# Patient Record
Sex: MALE | Race: WHITE
[De-identification: names, ages, dates, MRNs, and addresses within clinical notes are randomized per-mention and may not be internally consistent; named-entity substitution may affect disease eponyms.]

---

## 2023-06-30 ENCOUNTER — HOSPITAL ENCOUNTER (EMERGENCY)
Dept: HOSPITAL 75 - ER | Age: 58
LOS: 1 days | Discharge: HOME | End: 2023-07-01
Payer: COMMERCIAL

## 2023-06-30 VITALS — WEIGHT: 144.84 LBS | BODY MASS INDEX: 25.03 KG/M2 | HEIGHT: 63.78 IN

## 2023-06-30 DIAGNOSIS — Z28.310: ICD-10-CM

## 2023-06-30 DIAGNOSIS — R00.0: ICD-10-CM

## 2023-06-30 DIAGNOSIS — Z79.4: ICD-10-CM

## 2023-06-30 DIAGNOSIS — E11.65: Primary | ICD-10-CM

## 2023-06-30 LAB
ALBUMIN SERPL-MCNC: 3.4 GM/DL (ref 3.2–4.5)
ALP SERPL-CCNC: 133 U/L (ref 40–136)
ALT SERPL-CCNC: 14 U/L (ref 0–55)
BASOPHILS # BLD AUTO: 0 10^3/UL (ref 0–0.1)
BASOPHILS NFR BLD AUTO: 0 % (ref 0–10)
BILIRUB SERPL-MCNC: 0.2 MG/DL (ref 0.1–1)
BUN/CREAT SERPL: 12
CALCIUM SERPL-MCNC: 8.5 MG/DL (ref 8.5–10.1)
CHLORIDE SERPL-SCNC: 100 MMOL/L (ref 98–107)
CO2 SERPL-SCNC: 18 MMOL/L (ref 21–32)
CREAT SERPL-MCNC: 1.13 MG/DL (ref 0.6–1.3)
EOSINOPHIL # BLD AUTO: 0.2 10^3/UL (ref 0–0.3)
EOSINOPHIL NFR BLD AUTO: 3 % (ref 0–10)
GFR SERPLBLD BASED ON 1.73 SQ M-ARVRAT: 75 ML/MIN
GLUCOSE SERPL-MCNC: 678 MG/DL (ref 70–105)
HCT VFR BLD CALC: 39 % (ref 40–54)
HGB BLD-MCNC: 13.9 G/DL (ref 13.3–17.7)
LIPASE SERPL-CCNC: 63 U/L (ref 8–78)
LYMPHOCYTES # BLD AUTO: 2.5 10^3/UL (ref 1–4)
LYMPHOCYTES NFR BLD AUTO: 34 % (ref 12–44)
MANUAL DIFFERENTIAL PERFORMED BLD QL: NO
MCH RBC QN AUTO: 29 PG (ref 25–34)
MCHC RBC AUTO-ENTMCNC: 35 G/DL (ref 32–36)
MCV RBC AUTO: 81 FL (ref 80–99)
MONOCYTES # BLD AUTO: 0.4 10^3/UL (ref 0–1)
MONOCYTES NFR BLD AUTO: 6 % (ref 0–12)
NEUTROPHILS # BLD AUTO: 4.3 10^3/UL (ref 1.8–7.8)
NEUTROPHILS NFR BLD AUTO: 57 % (ref 42–75)
PLATELET # BLD: 275 10^3/UL (ref 130–400)
PMV BLD AUTO: 9.9 FL (ref 9–12.2)
POTASSIUM SERPL-SCNC: 3.8 MMOL/L (ref 3.6–5)
PROT SERPL-MCNC: 6.2 GM/DL (ref 6.4–8.2)
SODIUM SERPL-SCNC: 130 MMOL/L (ref 135–145)
WBC # BLD AUTO: 7.6 10^3/UL (ref 4.3–11)

## 2023-06-30 PROCEDURE — 85025 COMPLETE CBC W/AUTO DIFF WBC: CPT

## 2023-06-30 PROCEDURE — 82947 ASSAY GLUCOSE BLOOD QUANT: CPT

## 2023-06-30 PROCEDURE — 81000 URINALYSIS NONAUTO W/SCOPE: CPT

## 2023-06-30 PROCEDURE — 80053 COMPREHEN METABOLIC PANEL: CPT

## 2023-06-30 PROCEDURE — 83690 ASSAY OF LIPASE: CPT

## 2023-06-30 PROCEDURE — 36415 COLL VENOUS BLD VENIPUNCTURE: CPT

## 2023-06-30 PROCEDURE — 82010 KETONE BODYS QUAN: CPT

## 2023-06-30 PROCEDURE — 82805 BLOOD GASES W/O2 SATURATION: CPT

## 2023-06-30 PROCEDURE — 36600 WITHDRAWAL OF ARTERIAL BLOOD: CPT

## 2023-06-30 PROCEDURE — 80306 DRUG TEST PRSMV INSTRMNT: CPT

## 2023-07-01 VITALS — SYSTOLIC BLOOD PRESSURE: 146 MMHG | DIASTOLIC BLOOD PRESSURE: 92 MMHG

## 2023-07-01 LAB
APTT PPP: YELLOW S
ARTERIAL PATENCY WRIST A: (no result)
BACTERIA #/AREA URNS HPF: NEGATIVE /HPF
BARBITURATES UR QL: NEGATIVE
BASE EXCESS STD BLDA CALC-SCNC: -1.2 MMOL/L (ref -2.5–2.5)
BDY SITE: (no result)
BENZODIAZ UR QL SCN: NEGATIVE
BILIRUB UR QL STRIP: NEGATIVE
BODY TEMPERATURE: 36.6
CO2 BLDA CALC-SCNC: 24.3 MMOL/L (ref 21–31)
COCAINE UR QL: NEGATIVE
FIBRINOGEN PPP-MCNC: CLEAR MG/DL
GLUCOSE UR STRIP-MCNC: (no result) MG/DL
INHALED O2 FLOW RATE: (no result) L/MIN
KETONES UR QL STRIP: (no result)
LEUKOCYTE ESTERASE UR QL STRIP: NEGATIVE
METHADONE UR QL SCN: NEGATIVE
NITRITE UR QL STRIP: NEGATIVE
OPIATES UR QL SCN: NEGATIVE
OXYCODONE UR QL: NEGATIVE
PCO2 BLDA: 39 MMHG (ref 35–45)
PH BLDA: 7.39 [PH] (ref 7.37–7.43)
PH UR STRIP: 6 [PH] (ref 5–9)
PO2 BLDA: 77 MMHG (ref 79–93)
PROPOXYPH UR QL: NEGATIVE
PROT UR QL STRIP: NEGATIVE
RBC #/AREA URNS HPF: (no result) /HPF
SAO2 % BLDA FROM PO2: 96 % (ref 94–100)
SP GR UR STRIP: 1.01 (ref 1.02–1.02)
TRICYCLICS UR QL SCN: NEGATIVE
VENTILATION MODE VENT: NO
WBC #/AREA URNS HPF: (no result) /HPF

## 2023-07-01 NOTE — ED GENERAL
General


Chief Complaint:  Glucose Problems


Stated Complaint:  HIGH BLOOD SUGAR


Nursing Triage Note:  


patient known diabetic, states thirsty, tired, and has a headache.


Source of Information:  Patient, EMS


Exam Limitations:  No Limitations





History of Present Illness


Date Seen by Provider:  Jun 30, 2023


Time Seen by Provider:  23:20


Initial Comments


Patient is a 57-year-old male with a history of insulin-dependent diabetes who 

presents to the emergency department with a chief complaint of 24 hours of 

generalized malaise, fatigue, increased thirst, mild headache.  He has recently 

come in from [place] to stay with his sister.  He did not bring his long-acting 

insulin.  He states he took 25 units of short acting at around 5 PM.  His sugar 

has read "high" at home.  He denies chest pain, productive cough.  No abdominal 

pain.  No dysuria, no diarrhea.  No recent sick contacts.  He is a non-smoker.  

He did have 1 beer today.  Does not drink daily.


Timing/Duration:  24 Hours


Severity:  Moderate


Associated Systoms:  Headaches, Malaise, Weakness





Allergies and Home Medications


Allergies


Coded Allergies:  


     No Known Drug Allergies (Unverified , 6/30/23)





Patient Home Medication List


Home Medication List Reviewed:  Yes





Review of Systems


Review of Systems


Constitutional:  see HPI, malaise


EENTM:  no symptoms reported


Respiratory:  no symptoms reported


Cardiovascular:  no symptoms reported


Gastrointestinal:  nausea


Genitourinary:  frequency


Musculoskeletal:  no symptoms reported


Skin:  no symptoms reported


Psychiatric/Neurological:  Headache





All Other Systems Reviewed


Negative Unless Noted:  Yes





Past Medical-Social-Family Hx


Past Medical History


Surgery/Hospitalization HX:  


Diabetic, Ortho surgery





Physical Exam


Vital Signs





Vital Signs - First Documented








 6/30/23





 23:13


 


Temp 36.6


 


Pulse 98


 


Resp 20


 


B/P (MAP) 146/92 (110)


 


Pulse Ox 95


 


O2 Delivery Room Air





Capillary Refill : Less Than 3 Seconds


Height, Weight, BMI


Height: '"


Weight: lbs. oz. kg; 25.00 BMI


Method:


General Appearance:  No Apparent Distress, WD/WN, Thin


Eyes:  Bilateral Eye Normal Inspection, Bilateral Eye PERRL


HEENT:  PERRL/EOMI, Other (Dry oral mucosa)


Neck:  Normal Inspection


Respiratory:  Lungs Clear, Normal Breath Sounds, No Accessory Muscle Use, No 

Respiratory Distress


Cardiovascular:  Regular Rate, Rhythm, Normal Peripheral Pulses


Gastrointestinal:  Normal Bowel Sounds, Soft, Tenderness (Mid abdominal 

tenderness without involuntary guarding or rebound)


Extremity:  Normal Capillary Refill, Normal Inspection, Normal Range of Motion, 

Non Tender, No Calf Tenderness, No Pedal Edema


Neurologic/Psychiatric:  Alert, Oriented x3, No Motor/Sensory Deficits, Normal 

Mood/Affect, CNs II-XII Norm as Tested


Skin:  Normal Color, Warm/Dry





Progress/Results/Core Measures


Suspected Sepsis


SIRS


Temperature: 


Pulse: 98 


Respiratory Rate: 20


 


Laboratory Tests


6/30/23 23:15: White Blood Count 7.6


Blood Pressure 146 /92 


Mean: 110


 


Laboratory Tests


6/30/23 23:15: 


Creatinine 1.13, Platelet Count 275, Total Bilirubin 0.2








Results/Orders


Lab Results





Laboratory Tests








Test


 6/30/23


23:15 7/1/23


00:10 7/1/23


00:30 7/1/23


01:27 Range/Units


 


 


White Blood Count


 7.6 


 


 


 


 4.3-11.0


10^3/uL


 


Red Blood Count


 4.86 


 


 


 


 4.30-5.52


10^6/uL


 


Hemoglobin 13.9     13.3-17.7  g/dL


 


Hematocrit 39 L    40-54  %


 


Mean Corpuscular Volume 81     80-99  fL


 


Mean Corpuscular Hemoglobin 29     25-34  pg


 


Mean Corpuscular Hemoglobin


Concent 35 


 


 


 


 32-36  g/dL





 


Red Cell Distribution Width 12.0     10.0-14.5  %


 


Platelet Count


 275 


 


 


 


 130-400


10^3/uL


 


Mean Platelet Volume 9.9     9.0-12.2  fL


 


Immature Granulocyte % (Auto) 0      %


 


Neutrophils (%) (Auto) 57     42-75  %


 


Lymphocytes (%) (Auto) 34     12-44  %


 


Monocytes (%) (Auto) 6     0-12  %


 


Eosinophils (%) (Auto) 3     0-10  %


 


Basophils (%) (Auto) 0     0-10  %


 


Neutrophils # (Auto)


 4.3 


 


 


 


 1.8-7.8


10^3/uL


 


Lymphocytes # (Auto)


 2.5 


 


 


 


 1.0-4.0


10^3/uL


 


Monocytes # (Auto)


 0.4 


 


 


 


 0.0-1.0


10^3/uL


 


Eosinophils # (Auto)


 0.2 


 


 


 


 0.0-0.3


10^3/uL


 


Basophils # (Auto)


 0.0 


 


 


 


 0.0-0.1


10^3/uL


 


Immature Granulocyte # (Auto)


 0.0 


 


 


 


 0.0-0.1


10^3/uL


 


Sodium Level 130 L    135-145  MMOL/L


 


Potassium Level 3.8     3.6-5.0  MMOL/L


 


Chloride Level 100       MMOL/L


 


Carbon Dioxide Level 18 L    21-32  MMOL/L


 


Anion Gap 12     5-14  MMOL/L


 


Blood Urea Nitrogen 14     7-18  MG/DL


 


Creatinine


 1.13 


 


 


 


 0.60-1.30


MG/DL


 


Estimat Glomerular Filtration


Rate 75 


 


 


 


  





 


BUN/Creatinine Ratio 12      


 


Glucose Level 678 *H      MG/DL


 


Calcium Level 8.5     8.5-10.1  MG/DL


 


Corrected Calcium 9.0     8.5-10.1  MG/DL


 


Total Bilirubin 0.2     0.1-1.0  MG/DL


 


Aspartate Amino Transf


(AST/SGOT) 13 


 


 


 


 5-34  U/L





 


Alanine Aminotransferase


(ALT/SGPT) 14 


 


 


 


 0-55  U/L





 


Alkaline Phosphatase 133       U/L


 


Total Protein 6.2 L    6.4-8.2  GM/DL


 


Albumin 3.4     3.2-4.5  GM/DL


 


Lipase 63     8-78  U/L


 


Beta-Hydroxybutyrate (Chem


panel) 0.28 H


 


 


 


 0.00-0.27


MMOL/L


 


Urine Color  YELLOW     


 


Urine Clarity  CLEAR     


 


Urine pH  6.0    5-9  


 


Urine Specific Gravity  1.015 L   1.016-1.022  


 


Urine Protein  NEGATIVE    NEGATIVE  


 


Urine Glucose (UA)  3+ H   NEGATIVE  


 


Urine Ketones  TRACE H   NEGATIVE  


 


Urine Nitrite  NEGATIVE    NEGATIVE  


 


Urine Bilirubin  NEGATIVE    NEGATIVE  


 


Urine Urobilinogen  0.2    < = 1.0  MG/DL


 


Urine Leukocyte Esterase  NEGATIVE    NEGATIVE  


 


Urine RBC (Auto)  NEGATIVE    NEGATIVE  


 


Urine RBC  NONE     /HPF


 


Urine WBC  NONE     /HPF


 


Urine Crystals  NONE     /LPF


 


Urine Bacteria  NEGATIVE     /HPF


 


Urine Casts  NONE     /LPF


 


Urine Mucus  NEGATIVE     /LPF


 


Urine Culture Indicated  NO     


 


Urine Opiates Screen  NEGATIVE    NEGATIVE  


 


Urine Oxycodone Screen  NEGATIVE    NEGATIVE  


 


Urine Methadone Screen  NEGATIVE    NEGATIVE  


 


Urine Propoxyphene Screen  NEGATIVE    NEGATIVE  


 


Urine Barbiturates Screen  NEGATIVE    NEGATIVE  


 


Ur Tricyclic Antidepressants


Screen 


 NEGATIVE 


 


 


 NEGATIVE  





 


Urine Phencyclidine Screen  NEGATIVE    NEGATIVE  


 


Urine Amphetamines Screen  NEGATIVE    NEGATIVE  


 


Urine Methamphetamines Screen  NEGATIVE    NEGATIVE  


 


Urine Benzodiazepines Screen  NEGATIVE    NEGATIVE  


 


Urine Cocaine Screen  NEGATIVE    NEGATIVE  


 


Urine Cannabinoids Screen  NEGATIVE    NEGATIVE  


 


Blood Gas Puncture Site   RR    


 


Blood Gas Patient Temperature   36.6    


 


Arterial Blood pH   7.39   7.37-7.43  


 


Arterial Blood Partial


Pressure CO2 


 


 39 


 


 35-45  MMHG





 


Arterial Blood Partial


Pressure O2 


 


 77 L


 


 79-93  MMHG





 


Arterial Blood HCO3   23   23-27  MMOL/L


 


Arterial Blood Total CO2


 


 


 24.3 


 


 21.0-31.0


MMOL/L


 


Arterial Blood Oxygen


Saturation 


 


 96 


 


   %





 


Arterial Blood Base Excess


 


 


 -1.2 


 


 -2.5-2.5


MMOL/L


 


Dwaine Test   YES-POS    


 


Blood Gas Ventilator Setting   NO    


 


Blood Gas Inspired Oxygen   21%    








My Orders





Orders - JOYCE TAN MD


Ed Iv/Invasive Line Start (6/30/23 23:16)


Cbc With Automated Diff (6/30/23 23:16)


Comprehensive Metabolic Panel (6/30/23 23:16)


Lipase (6/30/23 23:16)


Ua Culture If Indicated (6/30/23 23:16)


Drug Screen Stat (Urine) (6/30/23 23:16)


Beta Hydroxybutyrate (6/30/23 23:16)


Ns Iv 1000 Ml (Sodium Chloride 0.9%) (6/30/23 23:16)


Accucheck Prn (6/30/23 23:16)


Acetaminophen  Tablet (Tylenol  Tablet) (6/30/23 23:45)


Arterial Blood Gas (7/1/23 00:25)


Insulin (Regular) Human (Novolin R (Per (7/1/23 00:35)


Insulin (Regular) Human (Novolin R (Per (7/1/23 00:35)


Arterial Blood Draw - Obtain (7/1/23 12:39)





Medications Given in ED





Current Medications








 Medications  Dose


 Ordered  Sig/John


 Route  Start Time


 Stop Time Status Last Admin


Dose Admin


 


 Acetaminophen  1,000 mg  ONCE  ONCE


 PO  6/30/23 23:45


 6/30/23 23:46 DC 6/30/23 23:46


1,000 MG








Vital Signs/I&O











 6/30/23





 23:13


 


Temp 36.6


 


Pulse 98


 


Resp 20


 


B/P (MAP) 146/92 (110)


 


Pulse Ox 95


 


O2 Delivery Room Air














 7/1/23





 00:00


 


Intake Total 1000 ml


 


Balance 1000 ml





Capillary Refill : Less Than 3 Seconds








Blood Pressure Mean:                    110








Progress Note :  


   Time:  01:30


Progress Note


Blood sugar 321 @ 0130





Patient seen and evaluated by me.  Pertinent physical exam findings well-

developed well-nourished male who is thin, appears dehydrated, dry mucous 

membranes.  No focal neurologic deficits.  Heart is regular, slightly 

tachycardic, lungs are clear.  Mild abdominal tenderness without involuntary 

guarding or rebound tenderness.  No lower extremity edema.  No obvious skin 

wounds or infections.





Still diagnosis based on history and physical exam, hyperglycemia, DKA, 

hyperkalemia, acute kidney injury.





Labs independently reviewed and interpreted by me.  Patient has a normal CBC.  

Chem-12 remarkable for blood sugar of 678.  CO2 decreased at 18.  Beta 

hydroxybutyric acid 0.28.  ABG shows a pH of 7.39 PCO2 of 39 PO2 of 77 bicarb of

23 on room air.  Urine tox is negative urinalysis is negative.  Is treated in 

the emergency department with a liter of normal saline, he completed a liter of 

normal saline per EMS for a total of 2 L total.  He was given 5 units of regular

insulin subcu and 5 units IV.  And 1 g of Tylenol for his headache.  He feels 

much better.  He is tolerating oral fluids, ice water at this time.  Blood sugar

repeated in the emergency department currently 321.  No evidence of DKA at this 

time.  He has relatively normal renal function, his potassium is 3.8.  No 

clinical or objective findings to warrant further labs or admission at this 

time.  Patient is strongly encouraged to follow his diabetic diet and as long as

he is in [place] to follow-up with [facility] and get back on 

his long acting insulin.  He verbalized understanding of the plan of care.  All 

questions are sought and answered.  Patient is improved at discharge.





Departure


Impression





   Primary Impression:  


   Hyperglycemia due to diabetes mellitus


Disposition:  01 HOME, SELF-CARE


Condition:  Improved





Departure-Patient Inst.


Decision time for Depature:  01:35


Referrals:  


[facility]





NO,LOCAL PHYSICIAN (PCP)


Primary Care Physician


Patient Instructions:  How to Prevent High Blood Sugar Emergencies in Diabetes





Add. Discharge Instructions:  


Drink plenty of fluids to stay well-hydrated.





You need to watch your carbohydrate/glucose intake daily.  Follow a sliding 

scale pattern with your regular insulin.





Please follow-up with [facility] to get back on your long-acting 

insulin.





If you have any other new, concerning or emergent complaints please return to 

the emergency department for reevaluation











JOYCE TAN MD          Jul 1, 2023 01:27